# Patient Record
Sex: MALE | Race: WHITE | NOT HISPANIC OR LATINO | Employment: FULL TIME | URBAN - NONMETROPOLITAN AREA
[De-identification: names, ages, dates, MRNs, and addresses within clinical notes are randomized per-mention and may not be internally consistent; named-entity substitution may affect disease eponyms.]

---

## 2018-01-23 ENCOUNTER — HISTORY (OUTPATIENT)
Dept: FAMILY MEDICINE | Facility: OTHER | Age: 43
End: 2018-01-23

## 2018-01-23 ENCOUNTER — OFFICE VISIT - GICH (OUTPATIENT)
Dept: FAMILY MEDICINE | Facility: OTHER | Age: 43
End: 2018-01-23

## 2018-01-23 DIAGNOSIS — Z23 ENCOUNTER FOR IMMUNIZATION: ICD-10-CM

## 2018-01-23 DIAGNOSIS — Z13.1 ENCOUNTER FOR SCREENING FOR DIABETES MELLITUS: ICD-10-CM

## 2018-01-23 DIAGNOSIS — D22.9 MELANOCYTIC NEVUS: ICD-10-CM

## 2018-01-23 DIAGNOSIS — Z13.220 ENCOUNTER FOR SCREENING FOR LIPOID DISORDERS: ICD-10-CM

## 2018-01-29 ENCOUNTER — DOCUMENTATION ONLY (OUTPATIENT)
Dept: FAMILY MEDICINE | Facility: OTHER | Age: 43
End: 2018-01-29

## 2018-02-06 ENCOUNTER — OFFICE VISIT - GICH (OUTPATIENT)
Dept: FAMILY MEDICINE | Facility: OTHER | Age: 43
End: 2018-02-06

## 2018-02-06 ENCOUNTER — HISTORY (OUTPATIENT)
Dept: FAMILY MEDICINE | Facility: OTHER | Age: 43
End: 2018-02-06

## 2018-02-06 DIAGNOSIS — Z13.1 ENCOUNTER FOR SCREENING FOR DIABETES MELLITUS: ICD-10-CM

## 2018-02-06 DIAGNOSIS — Z48.02 ENCOUNTER FOR REMOVAL OF SUTURES: ICD-10-CM

## 2018-02-06 DIAGNOSIS — Z13.220 ENCOUNTER FOR SCREENING FOR LIPOID DISORDERS: ICD-10-CM

## 2018-02-06 LAB
ANION GAP - HISTORICAL: 14 (ref 5–18)
BUN SERPL-MCNC: 16 MG/DL (ref 7–25)
BUN/CREAT RATIO - HISTORICAL: 18
CALCIUM SERPL-MCNC: 9.8 MG/DL (ref 8.6–10.3)
CHLORIDE SERPLBLD-SCNC: 101 MMOL/L (ref 98–107)
CHOL/HDL RATIO - HISTORICAL: 4.57
CHOLESTEROL TOTAL: 215 MG/DL
CO2 SERPL-SCNC: 25 MMOL/L (ref 21–31)
CREAT SERPL-MCNC: 0.89 MG/DL (ref 0.7–1.3)
GFR IF NOT AFRICAN AMERICAN - HISTORICAL: >60 ML/MIN/1.73M2
GLUCOSE SERPL-MCNC: 106 MG/DL (ref 70–105)
HDLC SERPL-MCNC: 47 MG/DL (ref 23–92)
LDLC SERPL CALC-MCNC: 121 MG/DL
NON-HDL CHOLESTEROL - HISTORICAL: 168 MG/DL
POTASSIUM SERPL-SCNC: 4 MMOL/L (ref 3.5–5.1)
PROVIDER ORDERDED STATUS - HISTORICAL: ABNORMAL
SODIUM SERPL-SCNC: 140 MMOL/L (ref 133–143)
TRIGL SERPL-MCNC: 236 MG/DL

## 2018-02-09 VITALS
DIASTOLIC BLOOD PRESSURE: 84 MMHG | HEART RATE: 64 BPM | SYSTOLIC BLOOD PRESSURE: 120 MMHG | BODY MASS INDEX: 39.36 KG/M2 | WEIGHT: 297 LBS | HEIGHT: 73 IN

## 2018-02-09 VITALS — HEART RATE: 76 BPM | WEIGHT: 300 LBS | SYSTOLIC BLOOD PRESSURE: 112 MMHG | DIASTOLIC BLOOD PRESSURE: 78 MMHG

## 2018-02-13 NOTE — PROGRESS NOTES
"Patient Information     Patient Name MRN Sex Da Rivas [id number] Male 1975      Progress Notes by Kevin Rice MD at 2018 10:00 AM     Author:  Kevin Rice MD Service:  (none) Author Type:  Physician     Filed:  2018 11:56 AM Encounter Date:  2018 Status:  Signed     :  Kevin Rice MD (Physician)            Nursing Notes:   Nora Martin  2018 10:34 AM  Signed  Patient comes in to the clinic today to have a mole checked on his back.      SUBJECTIVE:  Da Montalvo is a 42 y.o. Male.  He comes in today for evaluation on a mole on his back. Has been present for 5 years but has been enlarging steadily over the past couple of months. Has been slightly irritated. Patient has not really noticed that his wife has been bothered by it.    He is also due for fasting lipids and fasting glucose. He denies any chest pain. He has not any polyuria, polydipsia or other symptoms concerning for undiagnosed diabetes.      OBJECTIVE:  /84  Pulse 64  Ht 1.842 m (6' 0.5\")  Wt 134.7 kg (297 lb)  BMI 39.73 kg/m2  EXAM:  General Appearance: Pleasant, alert, appropriate appearance for age. No acute distress  Chest/Respiratory Exam: Normal chest wall and respirations. Clear to auscultation.  Cardiovascular Exam: Regular rate and rhythm. S1, S2, no murmur, click, gallop, or rubs.  Skin: 6 x 7 mm macule that is slightly raised with intrinsic light brown probable compound nevi centrally.  It appears inflamed. No ulceration.    Results for orders placed or performed in visit on 12      LAB GRAND ITASCA CONVERSION      Result  Value Ref Range    HEMOGLOBIN A1C GI 5.3 4.9 - 6.0 %       ASSESSMENT/Plan :      Da was seen today for derm problem.    Diagnoses and all orders for this visit:    Need for prophylactic vaccination with combined diphtheria-tetanus-pertussis (DTaP) vaccine  -     TDAP VACCINE IM  -     RI ADMIN VACC INITIAL    Screening cholesterol " level  patient is not fasting but he will come in for screening labs and we can go over them at his next visit.  -     LIPID PANEL; Future    Screening for diabetes mellitus  similar as above.  -     BASIC METABOLIC PANEL; Future    Atypical mole  discussed options. Given location and fact that it is enlarging at think it be reasonable to excise. This could represent basal cell cancer, nevus with atypia or may be inflamed seborrheic keratosis.      Risks, benefits and alternatives discussed with patient.  They voiced good understanding and had the opportunity to ask questions.  Patient gave consent and proceed with procedure.   Area was sterilized with choloraprep.  Anesthetized with 1% lidocaine with epi, total volume 2ml.  Using a 15 blade area was excised with 2mm margins.  Nonabsorbable sutures placed, will remove in 7-10d.    -     PATH TISSUE EXAM; Future  -     PATH TISSUE EXAM        There are no Patient Instructions on file for this visit.    Kevin Rice MD    This document was created using computer generated templates and voice activated software.

## 2018-02-13 NOTE — NURSING NOTE
Patient Information     Patient Name MRN Sex Da Rivas [id number] Male 1975      Nursing Note by Nora Martin at 2018 10:00 AM     Author:  Nora Martin Service:  (none) Author Type:  (none)     Filed:  2018 10:34 AM Encounter Date:  2018 Status:  Signed     :  Nora Martin            Patient comes in to the clinic today to have a mole checked on his back.

## 2018-02-13 NOTE — NURSING NOTE
Patient Information     Patient Name MRN Sex Da Rivas [id number] Male 1975      Nursing Note by Nora Martin at 2018  9:00 AM     Author:  Nora Martin Service:  (none) Author Type:  (none)     Filed:  2018  9:09 AM Encounter Date:  2018 Status:  Signed     :  Nora Martin            Patient comes in to the clinic today for suture removal and fasting labs.

## 2018-02-13 NOTE — PROGRESS NOTES
Patient Information     Patient Name MRN Sex Da Rivas [id number] Male 1975      Progress Notes by Kevin Rice MD at 2018  9:00 AM     Author:  Kevin Rice MD Service:  (none) Author Type:  Physician     Filed:  2018  9:25 AM Encounter Date:  2018 Status:  Signed     :  Kevin Rice MD (Physician)            Nursing Notes:   Nora Martin  2018  9:09 AM  Signed  Patient comes in to the clinic today for suture removal and fasting labs.      SUBJECTIVE:  Da Montalvo is a 42 y.o. Male.  He comes in today for suture removal. No concerns.      OBJECTIVE:  /78 (Cuff Size: Adult Large)  Pulse 76  Wt 136.1 kg (300 lb)  BMI 40.13 kg/m2  EXAM:  Skin: Well-healing incision        ASSESSMENT/Plan :      Da was seen today for suture removal and lab.    Diagnoses and all orders for this visit:    Visit for suture removal  sutures removed. Discussed benign pathology. Suggest regular skin check up with physicals.    Screening for diabetes mellitus  -     BASIC METABOLIC PANEL    Screening cholesterol level  -     LIPID PANEL        There are no Patient Instructions on file for this visit.    Kevin Rice MD    This document was created using computer generated templates and voice activated software.

## 2018-07-23 NOTE — PROGRESS NOTES
Patient Information     Patient Name  Da Montalvo MRN  [id number] Sex  Male   1975      Letter by Kevin Rice MD at      Author:  Kevin Rice MD Service:  (none) Author Type:  (none)    Filed:   Encounter Date:  2018 Status:  (Other)       Da Montalvo  [address] [place] [address]    2018      Dear Mr. Montalvo,      The pathologist reviewed your excision and it was a benign growth called a seborrheic keratosis.  There was some inflammation that made it appear to grow and look a little abnormal.  In any event, this is good news and does not require any further treatment.  We should still plan on taking out the sutures next week.    I'm sending along the actual report so that you will have it for your general interest and  records.       Take Care,   Kevin Rice MD      No results found from the In Basket message.

## 2018-07-23 NOTE — PROGRESS NOTES
Patient Information     Patient Name  Da Montalvo MRN  [id number] Sex  Male   1975      Letter by Kevin Rice MD at      Author:  Kevin Rice MD Service:  (none) Author Type:  (none)    Filed:   Encounter Date:  2018 Status:  (Other)         Da Montalvo  [address] [place] [address]    2018      Dear Mr. Montalvo,    Your lab results are listed below.  Your blood sugar is just minimally elevated but this does suggest you are at increased risk for developing diabetes in the next several years.  We should recheck this in 6 months.  Your cholesterol is also high but not to the degree that you need to start medications.  The best thing you can do for your health is really focus on eating lots of fruits and vegetables and staying away from saturated fats, sodium and processed foods including sweets and breads.  If you are able to loose 20lbs over the next 6 months I suspect you will have a significant improvement in your numbers but also in your health and wellbeing.      Contact us with any questions.    I'm sending along your actual numbers so that you will have them for your general interest and your records.       Take Care,   Kevin Rice MD      Resulted Orders      BASIC METABOLIC PANEL (Collected: 2018  9:20 AM)      Result  Value Ref Range    SODIUM 140 133 - 143 mmol/L    POTASSIUM 4.0 3.5 - 5.1 mmol/L    CHLORIDE 101 98 - 107 mmol/L    CO2,TOTAL 25 21 - 31 mmol/L    ANION GAP 14 5 - 18                    GLUCOSE 106 (H) 70 - 105 mg/dL    CALCIUM 9.8 8.6 - 10.3 mg/dL    BUN 16 7 - 25 mg/dL    CREATININE 0.89 0.70 - 1.30 mg/dL    BUN/CREAT RATIO           18                    GFR if African American >60 >60 ml/min/1.73m2    GFR if not African American >60 >60 ml/min/1.73m2   LIPID PANEL (Collected: 2018  9:20 AM)      Result  Value Ref Range    CHOLESTEROL,TOTAL 215 (H) <200 mg/dL    TRIGLYCERIDES 236 (H) <150 mg/dL    HDL CHOLESTEROL 47 23 - 92  mg/dL    NON-HDL CHOLESTEROL 168 (H) <145 mg/dl    CHOL/HDL RATIO            4.57 (H) <4.50                    LDL CHOLESTEROL 121 (H) <100 mg/dL    PROVIDER ORDERED STATUS FASTING

## 2018-12-20 ENCOUNTER — OFFICE VISIT (OUTPATIENT)
Dept: FAMILY MEDICINE | Facility: OTHER | Age: 43
End: 2018-12-20
Attending: FAMILY MEDICINE
Payer: COMMERCIAL

## 2018-12-20 VITALS
DIASTOLIC BLOOD PRESSURE: 73 MMHG | TEMPERATURE: 97.4 F | SYSTOLIC BLOOD PRESSURE: 117 MMHG | RESPIRATION RATE: 18 BRPM | HEART RATE: 80 BPM | BODY MASS INDEX: 40.61 KG/M2 | WEIGHT: 303.6 LBS

## 2018-12-20 DIAGNOSIS — E66.01 MORBID OBESITY (H): ICD-10-CM

## 2018-12-20 DIAGNOSIS — Z23 NEED FOR PROPHYLACTIC VACCINATION AND INOCULATION AGAINST INFLUENZA: Primary | ICD-10-CM

## 2018-12-20 DIAGNOSIS — B07.0 PLANTAR WARTS: ICD-10-CM

## 2018-12-20 PROCEDURE — 17110 DESTRUCTION B9 LES UP TO 14: CPT | Performed by: FAMILY MEDICINE

## 2018-12-20 PROCEDURE — 90471 IMMUNIZATION ADMIN: CPT | Performed by: FAMILY MEDICINE

## 2018-12-20 PROCEDURE — 90686 IIV4 VACC NO PRSV 0.5 ML IM: CPT | Performed by: FAMILY MEDICINE

## 2018-12-20 RX ORDER — TRIAMCINOLONE ACETONIDE 55 UG/1
2 SPRAY, METERED NASAL DAILY
COMMUNITY

## 2018-12-20 ASSESSMENT — PAIN SCALES - GENERAL: PAINLEVEL: NO PAIN (0)

## 2018-12-20 NOTE — NURSING NOTE
Patient is here for treatment of  Wart on bottom of right foot, has not had treated before.  Lanie Yao LPN .............12/20/2018     3:21 PM        Medication Reconciliation: complete    Lanie Yao LPN  12/20/2018 3:27 PM

## 2018-12-20 NOTE — PROGRESS NOTES
Injectable Influenza Immunization Documentation    1.  Is the person to be vaccinated sick today?   No    2. Does the person to be vaccinated have an allergy to a component   of the vaccine?   No  Egg Allergy Algorithm Link    3. Has the person to be vaccinated ever had a serious reaction   to influenza vaccine in the past?   No    4. Has the person to be vaccinated ever had Guillain-Barré syndrome?   No    Form completed by Lanie Yao LPN .............12/20/2018     3:24  Prior to injection, verified patient identity using patient's name and date of birth.  Due to injection administration, patient instructed to remain in clinic for 15 minutes  afterwards, and to report any adverse reaction to me immediately.    flu    Drug Amount Wasted:  None.  Vial/Syringe: Syringe

## 2018-12-20 NOTE — PROGRESS NOTES
SUBJECTIVE: 43 year old male complains of warts.   They have been present for 3 month(s).    OBJECTIVE: 1 wart(s) noted on the right plantar. Size range is 0.3 cm.    ASSESSMENT: Warts (Verruca Vulgaris)    PLAN: The viral etiology and natural history has been discussed.   Various treatment methods, side effects and failure rates have been   discussed.  A choice of liquid nitrogen was made, and the expected   blistering or scabbing reaction explained.  Liquid nitrogen was   applied to 1 wart(s);  the patient will return at 2-4 week intervals   for retreatments as needed.   Aimee Ugarte MD

## 2019-01-08 ENCOUNTER — OFFICE VISIT (OUTPATIENT)
Dept: FAMILY MEDICINE | Facility: OTHER | Age: 44
End: 2019-01-08
Attending: FAMILY MEDICINE
Payer: COMMERCIAL

## 2019-01-08 VITALS
BODY MASS INDEX: 40.4 KG/M2 | HEART RATE: 62 BPM | RESPIRATION RATE: 18 BRPM | WEIGHT: 302 LBS | SYSTOLIC BLOOD PRESSURE: 114 MMHG | DIASTOLIC BLOOD PRESSURE: 73 MMHG | TEMPERATURE: 97.3 F

## 2019-01-08 DIAGNOSIS — B07.0 PLANTAR WARTS: Primary | ICD-10-CM

## 2019-01-08 PROCEDURE — 17110 DESTRUCTION B9 LES UP TO 14: CPT | Performed by: FAMILY MEDICINE

## 2019-01-08 ASSESSMENT — PAIN SCALES - GENERAL: PAINLEVEL: NO PAIN (0)

## 2019-01-08 NOTE — NURSING NOTE
Patient is here for second treatment of wart on right foot. After first treatment felt great but is getting uncomfortable again.   Lanie Yao LPN .............1/8/2019     3:04 PM        Medication Reconciliation: complete    Lanie Yao LPN  1/8/2019 3:07 PM

## 2019-01-08 NOTE — PROGRESS NOTES
SUBJECTIVE: 43 year old male complains of warts.   They have been present for 4 month(s).    OBJECTIVE: 1 wart(s) noted on the right plantar. Size range is 0.2 cm.    ASSESSMENT: Warts (Verruca Vulgaris)    PLAN: The viral etiology and natural history has been discussed.   Various treatment methods, side effects and failure rates have been   discussed.  A choice of liquid nitrogen was made, and the expected   blistering or scabbing reaction explained.  Liquid nitrogen was   applied to 1 wart(s);  the patient will return at 2-4 week intervals   for retreatments as needed.   Aimee Ugarte MD

## 2019-01-25 ENCOUNTER — OFFICE VISIT (OUTPATIENT)
Dept: FAMILY MEDICINE | Facility: OTHER | Age: 44
End: 2019-01-25
Attending: FAMILY MEDICINE
Payer: COMMERCIAL

## 2019-01-25 VITALS
SYSTOLIC BLOOD PRESSURE: 118 MMHG | TEMPERATURE: 98.2 F | HEART RATE: 90 BPM | WEIGHT: 303 LBS | RESPIRATION RATE: 24 BRPM | DIASTOLIC BLOOD PRESSURE: 80 MMHG | BODY MASS INDEX: 40.53 KG/M2

## 2019-01-25 DIAGNOSIS — B07.0 PLANTAR WARTS: Primary | ICD-10-CM

## 2019-01-25 PROCEDURE — 17110 DESTRUCTION B9 LES UP TO 14: CPT | Performed by: FAMILY MEDICINE

## 2019-01-25 ASSESSMENT — PAIN SCALES - GENERAL: PAINLEVEL: NO PAIN (0)

## 2019-01-25 NOTE — LETTER
January 25, 2019      Da LAU Selvin  [address] [place] [address]        To Whom It May Concern:    Da Montalvo was seen in our clinic. He may return to work without restrictions.      Sincerely,        Aimee Ugarte MD

## 2019-01-25 NOTE — NURSING NOTE
Patient is here for repeat treatment of wart on right foot.  Lanie Yao LPN .............1/25/2019     11:55 AM      Medication Reconciliation: complete    Lanie Yao LPN  1/25/2019 11:57 AM

## 2019-01-25 NOTE — PROGRESS NOTES
SUBJECTIVE: 43 year old male needs retreatment of wart(s).    OBJECTIVE: 1 wart(s) noted on the right plantar. Size range is 0.3 cm.    ASSESSMENT: Warts (Verruca Vulgaris)    PLAN: Liquid nitrogen was applied to 1 wart(s);  the patient will   return at 2-4 week intervals for retreatments as needed.    Suspect he iwll not need additional treatments    Aimee Ugarte MD

## 2019-12-05 ENCOUNTER — OFFICE VISIT (OUTPATIENT)
Dept: FAMILY MEDICINE | Facility: OTHER | Age: 44
End: 2019-12-05
Attending: FAMILY MEDICINE
Payer: COMMERCIAL

## 2019-12-05 VITALS
SYSTOLIC BLOOD PRESSURE: 102 MMHG | WEIGHT: 309 LBS | OXYGEN SATURATION: 95 % | BODY MASS INDEX: 41.85 KG/M2 | HEIGHT: 72 IN | TEMPERATURE: 97.5 F | HEART RATE: 73 BPM | DIASTOLIC BLOOD PRESSURE: 64 MMHG

## 2019-12-05 DIAGNOSIS — Z11.4 SCREENING FOR HIV (HUMAN IMMUNODEFICIENCY VIRUS): ICD-10-CM

## 2019-12-05 DIAGNOSIS — Z00.00 ROUTINE GENERAL MEDICAL EXAMINATION AT A HEALTH CARE FACILITY: Primary | ICD-10-CM

## 2019-12-05 DIAGNOSIS — Z23 NEED FOR PROPHYLACTIC VACCINATION AND INOCULATION AGAINST INFLUENZA: ICD-10-CM

## 2019-12-05 DIAGNOSIS — Q53.10 UNILATERAL UNDESCENDED TESTICLE, UNSPECIFIED LOCATION: ICD-10-CM

## 2019-12-05 DIAGNOSIS — J33.9 NASAL POLYP: ICD-10-CM

## 2019-12-05 DIAGNOSIS — Z13.220 LIPID SCREENING: ICD-10-CM

## 2019-12-05 LAB
ALBUMIN SERPL-MCNC: 4 G/DL (ref 3.4–5)
ALP SERPL-CCNC: 71 U/L (ref 40–150)
ALT SERPL W P-5'-P-CCNC: 42 U/L (ref 0–70)
ANION GAP SERPL CALCULATED.3IONS-SCNC: 8 MMOL/L (ref 3–14)
AST SERPL W P-5'-P-CCNC: 20 U/L (ref 0–45)
BILIRUB SERPL-MCNC: 0.4 MG/DL (ref 0.2–1.3)
BUN SERPL-MCNC: 12 MG/DL (ref 7–30)
CALCIUM SERPL-MCNC: 9 MG/DL (ref 8.5–10.1)
CHLORIDE SERPL-SCNC: 106 MMOL/L (ref 94–109)
CHOLEST SERPL-MCNC: 216 MG/DL
CO2 SERPL-SCNC: 24 MMOL/L (ref 20–32)
CREAT SERPL-MCNC: 0.8 MG/DL (ref 0.66–1.25)
GFR SERPL CREATININE-BSD FRML MDRD: >90 ML/MIN/{1.73_M2}
GLUCOSE SERPL-MCNC: 100 MG/DL (ref 70–99)
HDLC SERPL-MCNC: 42 MG/DL
LDLC SERPL CALC-MCNC: 124 MG/DL
NONHDLC SERPL-MCNC: 174 MG/DL
POTASSIUM SERPL-SCNC: 3.9 MMOL/L (ref 3.4–5.3)
PROT SERPL-MCNC: 7.8 G/DL (ref 6.8–8.8)
SODIUM SERPL-SCNC: 138 MMOL/L (ref 133–144)
TRIGL SERPL-MCNC: 250 MG/DL

## 2019-12-05 PROCEDURE — 36415 COLL VENOUS BLD VENIPUNCTURE: CPT | Performed by: FAMILY MEDICINE

## 2019-12-05 PROCEDURE — 80061 LIPID PANEL: CPT | Performed by: FAMILY MEDICINE

## 2019-12-05 PROCEDURE — 80053 COMPREHEN METABOLIC PANEL: CPT | Performed by: FAMILY MEDICINE

## 2019-12-05 PROCEDURE — 99386 PREV VISIT NEW AGE 40-64: CPT | Mod: 25 | Performed by: FAMILY MEDICINE

## 2019-12-05 PROCEDURE — 90471 IMMUNIZATION ADMIN: CPT | Performed by: FAMILY MEDICINE

## 2019-12-05 PROCEDURE — 90686 IIV4 VACC NO PRSV 0.5 ML IM: CPT | Performed by: FAMILY MEDICINE

## 2019-12-05 RX ORDER — MUPIROCIN 20 MG/G
OINTMENT TOPICAL 3 TIMES DAILY
Qty: 22 G | Refills: 1 | Status: SHIPPED | OUTPATIENT
Start: 2019-12-05 | End: 2020-11-07

## 2019-12-05 ASSESSMENT — ANXIETY QUESTIONNAIRES
IF YOU CHECKED OFF ANY PROBLEMS ON THIS QUESTIONNAIRE, HOW DIFFICULT HAVE THESE PROBLEMS MADE IT FOR YOU TO DO YOUR WORK, TAKE CARE OF THINGS AT HOME, OR GET ALONG WITH OTHER PEOPLE: NOT DIFFICULT AT ALL
1. FEELING NERVOUS, ANXIOUS, OR ON EDGE: NOT AT ALL
2. NOT BEING ABLE TO STOP OR CONTROL WORRYING: NOT AT ALL
6. BECOMING EASILY ANNOYED OR IRRITABLE: SEVERAL DAYS
3. WORRYING TOO MUCH ABOUT DIFFERENT THINGS: NOT AT ALL
7. FEELING AFRAID AS IF SOMETHING AWFUL MIGHT HAPPEN: NOT AT ALL
GAD7 TOTAL SCORE: 1
5. BEING SO RESTLESS THAT IT IS HARD TO SIT STILL: NOT AT ALL

## 2019-12-05 ASSESSMENT — PATIENT HEALTH QUESTIONNAIRE - PHQ9
SUM OF ALL RESPONSES TO PHQ QUESTIONS 1-9: 4
5. POOR APPETITE OR OVEREATING: NOT AT ALL

## 2019-12-05 ASSESSMENT — MIFFLIN-ST. JEOR: SCORE: 2334.61

## 2019-12-05 ASSESSMENT — PAIN SCALES - GENERAL: PAINLEVEL: NO PAIN (0)

## 2019-12-05 NOTE — NURSING NOTE
Chief Complaint   Patient presents with     Physical       Initial /64 (BP Location: Right arm, Patient Position: Chair, Cuff Size: Adult Large)   Pulse 73   Temp 97.5  F (36.4  C) (Tympanic)   Ht 1.829 m (6')   Wt 140.2 kg (309 lb)   SpO2 95%   BMI 41.91 kg/m   Estimated body mass index is 41.91 kg/m  as calculated from the following:    Height as of this encounter: 1.829 m (6').    Weight as of this encounter: 140.2 kg (309 lb).  Medication Reconciliation: complete  Marilia Barclay LPN
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
## 2019-12-06 ASSESSMENT — ANXIETY QUESTIONNAIRES: GAD7 TOTAL SCORE: 1

## 2020-11-07 ENCOUNTER — ALLIED HEALTH/NURSE VISIT (OUTPATIENT)
Dept: FAMILY MEDICINE | Facility: OTHER | Age: 45
End: 2020-11-07
Attending: FAMILY MEDICINE
Payer: COMMERCIAL

## 2020-11-07 DIAGNOSIS — Z20.822 EXPOSURE TO COVID-19 VIRUS: Primary | ICD-10-CM

## 2020-11-07 DIAGNOSIS — Z20.822 EXPOSURE TO COVID-19 VIRUS: ICD-10-CM

## 2020-11-07 DIAGNOSIS — Z20.822 EXPOSURE TO 2019 NOVEL CORONAVIRUS: Primary | ICD-10-CM

## 2020-11-07 PROCEDURE — 99212 OFFICE O/P EST SF 10 MIN: CPT | Mod: 95 | Performed by: FAMILY MEDICINE

## 2020-11-07 PROCEDURE — C9803 HOPD COVID-19 SPEC COLLECT: HCPCS

## 2020-11-07 PROCEDURE — U0003 INFECTIOUS AGENT DETECTION BY NUCLEIC ACID (DNA OR RNA); SEVERE ACUTE RESPIRATORY SYNDROME CORONAVIRUS 2 (SARS-COV-2) (CORONAVIRUS DISEASE [COVID-19]), AMPLIFIED PROBE TECHNIQUE, MAKING USE OF HIGH THROUGHPUT TECHNOLOGIES AS DESCRIBED BY CMS-2020-01-R: HCPCS | Mod: ZL | Performed by: FAMILY MEDICINE

## 2020-11-07 PROCEDURE — 99207 PR NO CHARGE NURSE ONLY: CPT

## 2020-11-07 NOTE — PROGRESS NOTES
"Da Montalvo is a 45 year old male who is being evaluated via a billable telephone visit.      The patient has been notified of following:     \"This telephone visit will be conducted via a call between you and your physician/provider. We have found that certain health care needs can be provided without the need for a physical exam.  This service lets us provide the care you need with a short phone conversation.  If a prescription is necessary we can send it directly to your pharmacy.  If lab work is needed we can place an order for that and you can then stop by our lab to have the test done at a later time.    Telephone visits are billed at different rates depending on your insurance coverage. During this emergency period, for some insurers they may be billed the same as an in-person visit.  Please reach out to your insurance provider with any questions.    If during the course of the call the physician/provider feels a telephone visit is not appropriate, you will not be charged for this service.\"    Patient has given verbal consent for Telephone visit?  Yes      Subjective     Da Montalvo is a 45 year old male who presents via phone visit today for the following health issues:    HPI     Stayed with brother in [place]  Thur - Mon AM  Head congestion, but typical for him with allergies  No other COVID symptoms   He has been staying in garage away from family    Review of Systems          Objective          Vitals:  No vitals were obtained today due to virtual visit.    healthy, alert and no distress  PSYCH: Alert and oriented times 3; coherent speech, normal   rate and volume, able to articulate logical thoughts, able   to abstract reason, no tangential thoughts, no hallucinations   or delusions  His affect is normal  RESP: No cough, no audible wheezing, able to talk in full sentences  Remainder of exam unable to be completed due to telephone visits            Assessment/Plan:      ICD-10-CM    1. Exposure to COVID-19 " virus  Z20.828 Asymptomatic COVID-19 Virus (Coronavirus) by PCR     Significant potential exposure to Covid.  Regardless of test results he needs to complete 14 days of quarantine.  If he becomes symptomatic, consider to be contagious for 10 days from symptom start.  He would like to be tested for peace of mind.  Ordered curbside testing and given instructions.    Phone call duration:  6 minutes

## 2020-11-08 LAB
SARS-COV-2 RNA SPEC QL NAA+PROBE: ABNORMAL
SPECIMEN SOURCE: ABNORMAL

## 2021-01-03 ENCOUNTER — HEALTH MAINTENANCE LETTER (OUTPATIENT)
Age: 46
End: 2021-01-03

## 2021-01-15 ENCOUNTER — HEALTH MAINTENANCE LETTER (OUTPATIENT)
Age: 46
End: 2021-01-15

## 2021-05-24 ENCOUNTER — OFFICE VISIT (OUTPATIENT)
Dept: FAMILY MEDICINE | Facility: OTHER | Age: 46
End: 2021-05-24
Attending: PHYSICIAN ASSISTANT
Payer: COMMERCIAL

## 2021-05-24 VITALS
SYSTOLIC BLOOD PRESSURE: 128 MMHG | RESPIRATION RATE: 20 BRPM | DIASTOLIC BLOOD PRESSURE: 86 MMHG | HEART RATE: 74 BPM | TEMPERATURE: 98.6 F | OXYGEN SATURATION: 95 %

## 2021-05-24 DIAGNOSIS — S86.112A GASTROCNEMIUS MUSCLE TEAR, LEFT, INITIAL ENCOUNTER: ICD-10-CM

## 2021-05-24 DIAGNOSIS — M79.662 PAIN OF LEFT CALF: Primary | ICD-10-CM

## 2021-05-24 PROCEDURE — 99213 OFFICE O/P EST LOW 20 MIN: CPT | Performed by: PHYSICIAN ASSISTANT

## 2021-05-24 PROCEDURE — C9803 HOPD COVID-19 SPEC COLLECT: HCPCS

## 2021-05-24 ASSESSMENT — PAIN SCALES - GENERAL: PAINLEVEL: MILD PAIN (3)

## 2021-05-24 NOTE — PROGRESS NOTES
ASSESSMENT/PLAN:    Differential Diagnoses: gastrocnemius tear vs rupture vs sprain, achilles injury    I have reviewed the nursing notes.  I have reviewed the findings, diagnosis, plan and need for follow up with the patient.    (M79.662) Pain of left calf  (primary encounter diagnosis)  (S86.112A) Gastrocnemius muscle tear, left, initial encounter  Comment: acute onset  Plan: MR Tibia Fibula Lower Leg Left wo Contrast,         Crutches Order for DME - ONLY FOR DME  Vitals stable. PE consistent with probable rupture vs tear of medial gastrocnemius muscle. Did discuss with patient that XR would likely not be beneficial as would only show soft tissue swelling, U/S would assist in diagnosis but that MR would be definitive imaging of choice and that even with U/S an MR would likely still be ordered. I placed the MRI ordered for patient to expedite process for patient, he was also placed on crutches. We discussed use of CAM walker boot and will defer at this time as unsure extent of injury. Recommend use of crutches and off work until imaging is completed, letter written for patient. He should follow up with his PCP for definitive care and further management (referrals) if needed. He may alternate heat and ice, tylenol and ibuprofen every 4-6 hours. Daily limits reviewed. Follow up with worsening pain, redness, shortness of breath or other concerns. Patient is in agreement and understanding of the above treatment plan. All questions and concerns were addressed and answered to patient's satisfaction. AVS reviewed with patient.     Discussed warning signs/symptoms indicative of need to f/u    Follow up if symptoms persist or worsen or concerns    I explained my diagnostic considerations and recommendations to the patient, who voiced understanding and agreement with the treatment plan. All questions were answered. We discussed potential side effects of any prescribed or recommended therapies, as well as expectations for  response to treatments.    Miriam Pascal PA-C  2021  6:06 PM    HPI:    Da Montalvo is a 46 year old male  who presents to [facility] today for concerns of left leg injury. He was pushing a car onto a trailer and felt something go in his left leg. Felt like it hit in the back of his knee to the quad region - minimal pain. Felt a rubber band sensation, then let loose in posterior left leg.     Pain: minimal  Quality: sore  NTB: none  Treatment tried: none  Prior falls, injuries or trauma: none  Additional symptoms to report: none    NKDA    PCP: MD Toni    Past Medical History:   Diagnosis Date     Atrophy of testis     left     Concussion with loss of consciousness [id number]    Head injury/concussion; observation at [facility]     Nicotine dependence, uncomplicated           Obesity           Other seasonal allergic rhinitis     Seasonal allergic rhinitis     Past Surgical History:   Procedure Laterality Date     HERNIA REPAIR  1980          ORCHIOPEXY CHILD Left 1980          TONSILLECTOMY, ADENOIDECTOMY, COMBINED  1983          Social History     Tobacco Use     Smoking status: Former Smoker     Quit date: 2013     Years since quittin.3     Smokeless tobacco: Never Used   Substance Use Topics     Alcohol use: Yes     Alcohol/week: 1.0 standard drinks     Comment: social      Current Outpatient Medications   Medication Sig Dispense Refill     triamcinolone (NASACORT) 55 MCG/ACT nasal aerosol Spray 2 sprays into both nostrils daily       No Known Allergies  Past medical history, past surgical history, current medications and allergies reviewed and accurate to the best of my knowledge.      ROS:  Refer to HPI    /86   Pulse 74   Temp 98.6  F (37  C) (Tympanic)   Resp 20   SpO2 95%     EXAM:  General Appearance: Well appearing 46-year old male, appropriate appearance for age. No acute distress  Respiratory: normal chest wall and respirations.  Normal effort.  Clear to  auscultation bilaterally, no wheezing, crackles or rhonchi.  No increased work of breathing.  No cough appreciated.  Cardiac: RRR with no murmurs  Musculoskeletal:    Bilateral Knee Examination:  Gait: normal gait with no signs of guarding or compensation. Patient is able to get up and go from a seated position, in order to ambulate.    Appearance of the RIGHT knee: Skin is clean, dry, and non-ecchymotic. Effusion none. Tenderness to palpation none.  Patellar tracking is normal. ROM: flexion 130, extension 0. Stable to varus, valgus, Lachman, A&P drawer ligamentous stressing. Awa negative. Extension strength 5/5. Neurovascular status, distal pulses and sensation intact.    Appearance of the LEFT knee: Skin is clean, dry, and non-ecchymotic. Effusion none. Tenderness to palpation none.  Patellar tracking is normal. ROM: flexion 130, extension 0.  Stable to varus, valgus, Lachman, A&P drawer ligamentous stressing. Awa negative. Extension strength 5/5. Neurovascular status, distal pulses and sensation intact.    Left CALF/ANKLE PHYSICAL EXAMINATION:  Inspection: left calf 1.5 times size of right side, there is visible swelling to gastrocnemius muscle. No swelling, erythema, etc. No ecchymosis.     Palpation: Tenderness to palpation over medial head of gastrocnemius consistent with tear (at minimum) of gastrocnemius    ROM: plantarflexion full with pain in gastrocnemius at end ROM, dorsiflexion full, inversion ful, eversion full.     Stability testing:   Anterior drawer: negative    Syndesmotic Stress tests: Bump test/Squeeze tests are negative   Muñoz test: negative    Talar tilt: negative, no sign of calcaneofibular ligament strain   Inversion for Deltoid Ligament: 0 laxity to ligamentous stressing   Eversion for ATF Ligament: 0 laxity to ligamentous stressing     Neurovascular Exam:   Pulses: Dorsalis pedis and Posterior tibial pulse 2+   Capillary refill intact < 3 seconds   Sensation intact, patient  able to wiggle/move all toes   Dermatological: no rashes noted of exposed skin  Psychological: normal affect, alert, oriented, and pleasant.     Labs:  None    Xray:  MRI ordered outpatient

## 2021-05-24 NOTE — NURSING NOTE
Chief Complaint   Patient presents with     Leg Injury     Was pushing a car onto a trailer and he felt something let go in his left leg. He states that he felt it in the back of his knee to back of his quad area.    Initial There were no vitals taken for this visit. Estimated body mass index is 41.91 kg/m  as calculated from the following:    Height as of 12/5/19: 1.829 m (6').    Weight as of 12/5/19: 140.2 kg (309 lb).         Medication Reconciliation: Complete      Zain Mojica LPN

## 2021-05-24 NOTE — LETTER
[facility]  [address]  [place] [address]  Phone: [phone]  Fax: [phone]    May 24, 2021        Da Montalvo  [address] [place] [address]          To whom it may concern:    RE: Da Montalvo    Patient was seen and treated today at our clinic. He will be out of work indefinitely.     Please contact me for questions or concerns.      Sincerely,        Miriam Pascal PA-C

## 2021-05-25 ENCOUNTER — HOSPITAL ENCOUNTER (OUTPATIENT)
Dept: MRI IMAGING | Facility: OTHER | Age: 46
Discharge: HOME | End: 2021-05-25
Attending: PHYSICIAN ASSISTANT | Admitting: PHYSICIAN ASSISTANT
Payer: COMMERCIAL

## 2021-05-25 DIAGNOSIS — M79.662 PAIN OF LEFT CALF: ICD-10-CM

## 2021-05-25 DIAGNOSIS — S86.112A GASTROCNEMIUS MUSCLE TEAR, LEFT, INITIAL ENCOUNTER: ICD-10-CM

## 2021-05-25 PROCEDURE — 73718 MRI LOWER EXTREMITY W/O DYE: CPT | Mod: LT

## 2021-05-25 NOTE — PROGRESS NOTES
Assessment & Plan     Strain of left calf muscle  Tennis leg with tear and edema.  MRI reviewed.  He is a  at [employer].  No way he can do that right now.  Plan to reassess at about the 4 week point.  appt made for 6/17 and he will report any change for the worse.  We talked about ortho, but we're both pretty comfortable with this as no surgery indicated and he is already slightly better.          25 minutes spent with patient, exam, documentation, and literature review along with the form.         BMI:   Estimated body mass index is 40.01 kg/m  as calculated from the following:    Height as of this encounter: 1.829 m (6').    Weight as of this encounter: 133.8 kg (295 lb).           No follow-ups on file.    David Muñoz MD  [facility]   Da is a 46 year old who presents for the following health issues     HPI   ED/UC Followup:    Facility:  [facility]  Date of visit: 5/24/21  Reason for visit: left lower leg injury  Current Status: MRI done 5/25/21              Review of Systems   Constitutional, HEENT, cardiovascular, pulmonary, gi and gu systems are negative, except as otherwise noted.      Objective    /70   Pulse 61   Temp 97.2  F (36.2  C)   Ht 1.829 m (6')   Wt 133.8 kg (295 lb)   SpO2 97%   BMI 40.01 kg/m    Body mass index is 40.01 kg/m .  Physical Exam   GENERAL: healthy, alert and no distress  MS: tender over the left proximal calf.          4 week f/u sooner with concerns.  All questions answered.  We did a literature review on this prior to the visit as well.        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

## 2021-05-27 ENCOUNTER — OFFICE VISIT (OUTPATIENT)
Dept: FAMILY MEDICINE | Facility: OTHER | Age: 46
End: 2021-05-27
Attending: PHYSICIAN ASSISTANT
Payer: COMMERCIAL

## 2021-05-27 VITALS
WEIGHT: 295 LBS | OXYGEN SATURATION: 97 % | HEIGHT: 72 IN | HEART RATE: 61 BPM | DIASTOLIC BLOOD PRESSURE: 70 MMHG | TEMPERATURE: 97.2 F | SYSTOLIC BLOOD PRESSURE: 116 MMHG | BODY MASS INDEX: 39.96 KG/M2

## 2021-05-27 DIAGNOSIS — S86.812A STRAIN OF LEFT CALF MUSCLE: Primary | ICD-10-CM

## 2021-05-27 PROCEDURE — 99214 OFFICE O/P EST MOD 30 MIN: CPT | Performed by: FAMILY MEDICINE

## 2021-05-27 RX ORDER — CETIRIZINE HYDROCHLORIDE 10 MG/1
1 TABLET ORAL DAILY
COMMUNITY
Start: 2021-05-01

## 2021-05-27 ASSESSMENT — PAIN SCALES - GENERAL: PAINLEVEL: MILD PAIN (2)

## 2021-05-27 ASSESSMENT — MIFFLIN-ST. JEOR: SCORE: 2261.11

## 2021-05-27 NOTE — NURSING NOTE
Chief Complaint   Patient presents with     ER F/U       Initial /70   Pulse 61   Temp 97.2  F (36.2  C)   Ht 1.829 m (6')   Wt 133.8 kg (295 lb)   SpO2 97%   BMI 40.01 kg/m   Estimated body mass index is 40.01 kg/m  as calculated from the following:    Height as of this encounter: 1.829 m (6').    Weight as of this encounter: 133.8 kg (295 lb).  Medication Reconciliation: complete  Maria Ines Banuelos LPN

## 2021-06-10 NOTE — PROGRESS NOTES
Assessment & Plan     Pain of left calf  Improved but ongoing.  Any uneven ground he can't really walk on confidently.  Keep him out another couple of weeks.  Recheck late next week.  Hopefully get him back to work as soon as enough stability with gait occurs.                 BMI:   Estimated body mass index is 40.42 kg/m  as calculated from the following:    Height as of 5/27/21: 1.829 m (6').    Weight as of this encounter: 135.2 kg (298 lb).           No follow-ups on file.    David Muñoz MD  [facility]   Da is a 46 year old who presents for the following health issues     HPI     Recheck       Duration: 5/24/21    Description (location/character/radiation): left lower leg    Intensity:  mild    Accompanying signs and symptoms: swelling in ankle 1/10 pain .     History (similar episodes/previous evaluation): mri     Precipitating or alleviating factors: pain worse on stairs, and walking     Therapies tried and outcome: ice, compression sleeve , rest. ibu . Elevation             Review of Systems   Constitutional, HEENT, cardiovascular, pulmonary, gi and gu systems are negative, except as otherwise noted.      Objective    There were no vitals taken for this visit.  There is no height or weight on file to calculate BMI.  Physical Exam   GENERAL: healthy, alert and no distress  MS: no gross musculoskeletal defects noted, no edema.  Photo of bruising at ankle.  SKIN: no suspicious lesions or rashes

## 2021-06-17 ENCOUNTER — OFFICE VISIT (OUTPATIENT)
Dept: FAMILY MEDICINE | Facility: OTHER | Age: 46
End: 2021-06-17
Attending: FAMILY MEDICINE
Payer: COMMERCIAL

## 2021-06-17 VITALS
TEMPERATURE: 98.4 F | SYSTOLIC BLOOD PRESSURE: 112 MMHG | DIASTOLIC BLOOD PRESSURE: 78 MMHG | OXYGEN SATURATION: 95 % | HEART RATE: 87 BPM | WEIGHT: 298 LBS | BODY MASS INDEX: 40.42 KG/M2

## 2021-06-17 DIAGNOSIS — M79.662 PAIN OF LEFT CALF: Primary | ICD-10-CM

## 2021-06-17 PROCEDURE — 99213 OFFICE O/P EST LOW 20 MIN: CPT | Performed by: FAMILY MEDICINE

## 2021-06-17 ASSESSMENT — PAIN SCALES - GENERAL: PAINLEVEL: NO PAIN (1)

## 2021-06-17 NOTE — LETTER
June 17, 2021      Da Montalvo  [address] [place] [address]        To Whom It May Concern:    Da Montalvo was seen in our clinic. He cannot return to work at this time due to injury.  I am seeing him again in clinic on 6/24/21.  Hopefully we can get him cleared in the near future.      Sincerely,        David Muñoz MD

## 2021-06-24 ENCOUNTER — OFFICE VISIT (OUTPATIENT)
Dept: FAMILY MEDICINE | Facility: OTHER | Age: 46
End: 2021-06-24
Attending: FAMILY MEDICINE
Payer: COMMERCIAL

## 2021-06-24 VITALS
HEART RATE: 71 BPM | SYSTOLIC BLOOD PRESSURE: 118 MMHG | TEMPERATURE: 97.9 F | OXYGEN SATURATION: 95 % | WEIGHT: 297 LBS | BODY MASS INDEX: 40.28 KG/M2 | DIASTOLIC BLOOD PRESSURE: 76 MMHG

## 2021-06-24 DIAGNOSIS — M79.662 PAIN OF LEFT CALF: Primary | ICD-10-CM

## 2021-06-24 PROCEDURE — 99213 OFFICE O/P EST LOW 20 MIN: CPT | Performed by: FAMILY MEDICINE

## 2021-06-24 ASSESSMENT — PAIN SCALES - GENERAL: PAINLEVEL: NO PAIN (0)

## 2021-06-24 NOTE — NURSING NOTE
Chief Complaint   Patient presents with     RECHECK       Initial /76   Pulse 71   Temp 97.9  F (36.6  C)   Wt 134.7 kg (297 lb)   SpO2 95%   BMI 40.28 kg/m   Estimated body mass index is 40.28 kg/m  as calculated from the following:    Height as of 5/27/21: 1.829 m (6').    Weight as of this encounter: 134.7 kg (297 lb).  Medication Reconciliation: complete  Maria Ines Baneulos LPN

## 2021-06-24 NOTE — PROGRESS NOTES
Assessment & Plan     Pain of left calf  Tennis leg.  Pain is ongoing slightly but overall stable.  I want him to wait through the weekend and start on Monday and keep working on gentle ROM of the left ankle to ensure stability prior to returning.   /[employer] at [employer] and he will be careful on uneven ground.  Paperwork for return to work done.  He will f/u as needed.  He will wear fabrizio sock on left lower leg for swelling prevention.                 BMI:   Estimated body mass index is 40.28 kg/m  as calculated from the following:    Height as of 5/27/21: 1.829 m (6').    Weight as of this encounter: 134.7 kg (297 lb).           No follow-ups on file.    David Muñoz MD  [facility]   Da is a 46 year old who presents for the following health issues     HPI     Leg injury      Duration: 5/24/21    Description (location/character/radiation): left leg    Intensity:  moderate    Accompanying signs and symptoms: lightness    History (similar episodes/previous evaluation): tennis leg    Precipitating or alleviating factors: None    Therapies tried and outcome: None            Review of Systems   Constitutional, HEENT, cardiovascular, pulmonary, gi and gu systems are negative, except as otherwise noted.      Objective    /76   Pulse 71   Temp 97.9  F (36.6  C)   Wt 134.7 kg (297 lb)   SpO2 95%   BMI 40.28 kg/m    Body mass index is 40.28 kg/m .  Physical Exam   GENERAL: healthy, alert and no distress  MS: no gross musculoskeletal defects noted, no edema  MS: no tenderness.  He has full rom of the ankle.    SKIN: no suspicious lesions or rashes

## 2021-08-16 ASSESSMENT — ANXIETY QUESTIONNAIRES
5. BEING SO RESTLESS THAT IT IS HARD TO SIT STILL: NOT AT ALL
1. FEELING NERVOUS, ANXIOUS, OR ON EDGE: NOT AT ALL
7. FEELING AFRAID AS IF SOMETHING AWFUL MIGHT HAPPEN: NOT AT ALL
GAD7 TOTAL SCORE: 1
6. BECOMING EASILY ANNOYED OR IRRITABLE: SEVERAL DAYS
2. NOT BEING ABLE TO STOP OR CONTROL WORRYING: NOT AT ALL
GAD7 TOTAL SCORE: 1
GAD7 TOTAL SCORE: 1
8. IF YOU CHECKED OFF ANY PROBLEMS, HOW DIFFICULT HAVE THESE MADE IT FOR YOU TO DO YOUR WORK, TAKE CARE OF THINGS AT HOME, OR GET ALONG WITH OTHER PEOPLE?: NOT DIFFICULT AT ALL
3. WORRYING TOO MUCH ABOUT DIFFERENT THINGS: NOT AT ALL
4. TROUBLE RELAXING: NOT AT ALL
7. FEELING AFRAID AS IF SOMETHING AWFUL MIGHT HAPPEN: NOT AT ALL

## 2021-08-16 ASSESSMENT — PATIENT HEALTH QUESTIONNAIRE - PHQ9
SUM OF ALL RESPONSES TO PHQ QUESTIONS 1-9: 0
10. IF YOU CHECKED OFF ANY PROBLEMS, HOW DIFFICULT HAVE THESE PROBLEMS MADE IT FOR YOU TO DO YOUR WORK, TAKE CARE OF THINGS AT HOME, OR GET ALONG WITH OTHER PEOPLE: NOT DIFFICULT AT ALL
SUM OF ALL RESPONSES TO PHQ QUESTIONS 1-9: 0

## 2021-08-16 NOTE — PROGRESS NOTES
SUBJECTIVE:   CC: Da Montalvo is an 46 year old male who presents for preventative health visit.       Patient has been advised of split billing requirements and indicates understanding: Yes  Healthy Habits:     Getting at least 3 servings of Calcium per day:  Yes    Bi-annual eye exam:  NO    Dental care twice a year:  Yes    Sleep apnea or symptoms of sleep apnea:  None    Diet:  Regular (no restrictions)    Frequency of exercise:  1 day/week    Duration of exercise:  15-30 minutes    Taking medications regularly:  Yes    Medication side effects:  None    PHQ-2 Total Score: 0    Additional concerns today:  No              Today's PHQ-2 Score:   PHQ-2 (  [employer]) 2021   Q1: Little interest or pleasure in doing things 0   Q2: Feeling down, depressed or hopeless 0   PHQ-2 Score 0   Q1: Little interest or pleasure in doing things Not at all   Q2: Feeling down, depressed or hopeless Not at all   PHQ-2 Score 0       Abuse: Current or Past(Physical, Sexual or Emotional)- No  Do you feel safe in your environment? Yes        Social History     Tobacco Use     Smoking status: Former Smoker     Quit date: 2013     Years since quittin.6     Smokeless tobacco: Never Used   Substance Use Topics     Alcohol use: Yes     Alcohol/week: 1.0 standard drinks     Comment: social          Alcohol Use 2021   Prescreen: >3 drinks/day or >7 drinks/week? Yes   AUDIT SCORE  5       Last PSA: No results found for: PSA    Reviewed orders with patient. Reviewed health maintenance and updated orders accordingly - Yes  Lab work is in process    Reviewed and updated as needed this visit by clinical staff  Tobacco  Allergies  Meds              Reviewed and updated as needed this visit by Provider                Past Medical History:   Diagnosis Date     Atrophy of testis     left     Concussion with loss of consciousness [id number]    Head injury/concussion; observation at [facility]     Nicotine dependence, uncomplicated            "Obesity           Other seasonal allergic rhinitis     Seasonal allergic rhinitis      Past Surgical History:   Procedure Laterality Date     HERNIA REPAIR  03/03/1980          ORCHIOPEXY CHILD Left 03/03/1980          TONSILLECTOMY, ADENOIDECTOMY, COMBINED  07/25/1983            Review of Systems  CONSTITUTIONAL: NEGATIVE for fever, chills, change in weight  INTEGUMENTARY/SKIN: NEGATIVE for worrisome rashes, moles or lesions  EYES: NEGATIVE for vision changes or irritation  ENT: NEGATIVE for ear, mouth and throat problems  RESP: NEGATIVE for significant cough or SOB  CV: NEGATIVE for chest pain, palpitations or peripheral edema  GI: NEGATIVE for nausea, abdominal pain, heartburn, or change in bowel habits   male: negative for dysuria, hematuria, decreased urinary stream, erectile dysfunction, urethral discharge  MUSCULOSKELETAL: NEGATIVE for significant arthralgias or myalgia  NEURO: NEGATIVE for weakness, dizziness or paresthesias  PSYCHIATRIC: NEGATIVE for changes in mood or affect    OBJECTIVE:   /68   Pulse 63   Temp 97.5  F (36.4  C)   Resp 20   Ht 1.854 m (6' 1\")   Wt 136.1 kg (300 lb)   SpO2 95%   BMI 39.58 kg/m      Physical Exam  GENERAL: healthy, alert and no distress  EYES: Eyes grossly normal to inspection, PERRL and conjunctivae and sclerae normal  HENT: ear canals and TM's normal, nose and mouth without ulcers or lesions  NECK: no adenopathy, no asymmetry, masses, or scars and thyroid normal to palpation  RESP: lungs clear to auscultation - no rales, rhonchi or wheezes  CV: regular rate and rhythm, normal S1 S2, no S3 or S4, no murmur, click or rub, no peripheral edema and peripheral pulses strong  ABDOMEN: soft, nontender, no hepatosplenomegaly, no masses and bowel sounds normal   (male): normal male genitalia without lesions or urethral discharge, no hernia  MS: no gross musculoskeletal defects noted, no edema  SKIN: no suspicious lesions or rashes  NEURO: Normal strength and tone, " "mentation intact and speech normal  PSYCH: mentation appears normal, affect normal/bright    Diagnostic Test Results:  Labs reviewed in Epic    ASSESSMENT/PLAN:       ICD-10-CM    1. Routine general medical examination at a health care facility  Z00.00    2. Lipid screening  Z13.220 Comprehensive metabolic panel     Lipid Profile   3. Screening for HIV (human immunodeficiency virus)  Z11.4 HIV Antigen Antibody Combo   4. Need for hepatitis C screening test  Z11.59 Hepatitis C Screen Reflex to HCV RNA Quant and Genotype       Patient has been advised of split billing requirements and indicates understanding:   COUNSELING:   Reviewed preventive health counseling, as reflected in patient instructions    Estimated body mass index is 39.58 kg/m  as calculated from the following:    Height as of this encounter: 1.854 m (6' 1\").    Weight as of this encounter: 136.1 kg (300 lb).         He reports that he quit smoking about 8 years ago. He has never used smokeless tobacco.      Counseling Resources:  ATP IV Guidelines  Pooled Cohorts Equation Calculator  FRAX Risk Assessment  ICSI Preventive Guidelines  Dietary Guidelines for Americans, 2010  USDA's MyPlate  ASA Prophylaxis  Lung CA Screening    David Muñoz MD  [facility]  "

## 2021-08-17 ENCOUNTER — OFFICE VISIT (OUTPATIENT)
Dept: FAMILY MEDICINE | Facility: OTHER | Age: 46
End: 2021-08-17
Attending: FAMILY MEDICINE
Payer: COMMERCIAL

## 2021-08-17 VITALS
SYSTOLIC BLOOD PRESSURE: 120 MMHG | OXYGEN SATURATION: 95 % | BODY MASS INDEX: 39.76 KG/M2 | TEMPERATURE: 97.5 F | RESPIRATION RATE: 20 BRPM | HEIGHT: 73 IN | WEIGHT: 300 LBS | DIASTOLIC BLOOD PRESSURE: 68 MMHG | HEART RATE: 63 BPM

## 2021-08-17 DIAGNOSIS — Z11.59 NEED FOR HEPATITIS C SCREENING TEST: ICD-10-CM

## 2021-08-17 DIAGNOSIS — Z13.220 LIPID SCREENING: ICD-10-CM

## 2021-08-17 DIAGNOSIS — Z00.00 ROUTINE GENERAL MEDICAL EXAMINATION AT A HEALTH CARE FACILITY: Primary | ICD-10-CM

## 2021-08-17 DIAGNOSIS — Z11.4 SCREENING FOR HIV (HUMAN IMMUNODEFICIENCY VIRUS): ICD-10-CM

## 2021-08-17 LAB
ALBUMIN SERPL-MCNC: 4 G/DL (ref 3.4–5)
ALP SERPL-CCNC: 78 U/L (ref 40–150)
ALT SERPL W P-5'-P-CCNC: 34 U/L (ref 0–70)
ANION GAP SERPL CALCULATED.3IONS-SCNC: 6 MMOL/L (ref 3–14)
AST SERPL W P-5'-P-CCNC: 19 U/L (ref 0–45)
BILIRUB SERPL-MCNC: 0.4 MG/DL (ref 0.2–1.3)
BUN SERPL-MCNC: 11 MG/DL (ref 7–30)
CALCIUM SERPL-MCNC: 9.2 MG/DL (ref 8.5–10.1)
CHLORIDE BLD-SCNC: 107 MMOL/L (ref 94–109)
CHOLEST SERPL-MCNC: 221 MG/DL
CO2 SERPL-SCNC: 25 MMOL/L (ref 20–32)
CREAT SERPL-MCNC: 0.93 MG/DL (ref 0.66–1.25)
FASTING STATUS PATIENT QL REPORTED: YES
GFR SERPL CREATININE-BSD FRML MDRD: >90 ML/MIN/1.73M2
GLUCOSE BLD-MCNC: 95 MG/DL (ref 70–99)
HDLC SERPL-MCNC: 46 MG/DL
LDLC SERPL CALC-MCNC: 126 MG/DL
NONHDLC SERPL-MCNC: 175 MG/DL
POTASSIUM BLD-SCNC: 4 MMOL/L (ref 3.4–5.3)
PROT SERPL-MCNC: 7.9 G/DL (ref 6.8–8.8)
SODIUM SERPL-SCNC: 138 MMOL/L (ref 133–144)
TRIGL SERPL-MCNC: 243 MG/DL

## 2021-08-17 PROCEDURE — 80061 LIPID PANEL: CPT | Performed by: FAMILY MEDICINE

## 2021-08-17 PROCEDURE — 86803 HEPATITIS C AB TEST: CPT | Performed by: FAMILY MEDICINE

## 2021-08-17 PROCEDURE — 87389 HIV-1 AG W/HIV-1&-2 AB AG IA: CPT | Performed by: FAMILY MEDICINE

## 2021-08-17 PROCEDURE — 99396 PREV VISIT EST AGE 40-64: CPT | Performed by: FAMILY MEDICINE

## 2021-08-17 PROCEDURE — 36415 COLL VENOUS BLD VENIPUNCTURE: CPT | Performed by: FAMILY MEDICINE

## 2021-08-17 PROCEDURE — 80053 COMPREHEN METABOLIC PANEL: CPT | Performed by: FAMILY MEDICINE

## 2021-08-17 ASSESSMENT — MIFFLIN-ST. JEOR: SCORE: 2299.67

## 2021-08-17 ASSESSMENT — ANXIETY QUESTIONNAIRES: GAD7 TOTAL SCORE: 1

## 2021-08-17 ASSESSMENT — PAIN SCALES - GENERAL: PAINLEVEL: NO PAIN (0)

## 2021-08-18 LAB
HCV AB SERPL QL IA: NONREACTIVE
HIV 1+2 AB+HIV1 P24 AG SERPL QL IA: NONREACTIVE

## 2021-10-09 ENCOUNTER — HEALTH MAINTENANCE LETTER (OUTPATIENT)
Age: 46
End: 2021-10-09

## 2022-09-17 ENCOUNTER — HEALTH MAINTENANCE LETTER (OUTPATIENT)
Age: 47
End: 2022-09-17

## 2023-01-08 NOTE — NURSING NOTE
"Chief Complaint   Patient presents with     Physical       Initial /68   Pulse 63   Temp 97.5  F (36.4  C)   Resp 20   Ht 1.854 m (6' 1\")   Wt 136.1 kg (300 lb)   SpO2 95%   BMI 39.58 kg/m   Estimated body mass index is 39.58 kg/m  as calculated from the following:    Height as of this encounter: 1.854 m (6' 1\").    Weight as of this encounter: 136.1 kg (300 lb).  Medication Reconciliation: complete  Maria Ines Banuelos, LPN  "
No

## 2023-08-16 NOTE — PROGRESS NOTES
Assessment & Plan     Skin lesion  Likely skin tag with a roughened edge making it look a little like a wart.  It rubs on his fall prevention gear and gets painful.  I removed it today.  I sent to path with the non classical tag look to it.  Follow up pending the results.    - Surgical Pathology Exam      Procedure:  area of left groin is cleansed.  Skin lesion is anesthetized with lidocaine with epi, and the lesion is removed with a dermablade.  I did have to use some silver nitrate for some weeping.  EBL less than 5 ml's.  No complications encountered.             No follow-ups on file.    David Muñoz MD  [facility]    Nitza Lane is a 48 year old, presenting for the following health issues:  Skin Tags      HPI     Skin tag     Duration: years   Description (location/character/radiation): left groin area   Intensity:  moderate  Accompanying signs and symptoms: getting bigger   History (similar episodes/previous evaluation): None  Precipitating or alleviating factors: None  Therapies tried and outcome: None              Review of Systems   Constitutional, HEENT, cardiovascular, pulmonary, gi and gu systems are negative, except as otherwise noted.      Objective    /66   Pulse 101   Temp 98.4  F (36.9  C) (Tympanic)   Wt 141.1 kg (311 lb)   SpO2 95%   BMI 41.03 kg/m    Body mass index is 41.03 kg/m .  Physical Exam   GENERAL: healthy, alert and no distress  MS: no gross musculoskeletal defects noted, no edema  SKIN: 1-2 cm raised lesion with irregular borders and some inflammation present.  This is removed as above.

## 2023-08-22 ENCOUNTER — OFFICE VISIT (OUTPATIENT)
Dept: FAMILY MEDICINE | Facility: OTHER | Age: 48
End: 2023-08-22
Attending: FAMILY MEDICINE
Payer: COMMERCIAL

## 2023-08-22 VITALS
OXYGEN SATURATION: 95 % | SYSTOLIC BLOOD PRESSURE: 124 MMHG | TEMPERATURE: 98.4 F | BODY MASS INDEX: 41.03 KG/M2 | WEIGHT: 311 LBS | DIASTOLIC BLOOD PRESSURE: 66 MMHG | HEART RATE: 101 BPM

## 2023-08-22 DIAGNOSIS — L98.9 SKIN LESION: Primary | ICD-10-CM

## 2023-08-22 PROCEDURE — 88305 TISSUE EXAM BY PATHOLOGIST: CPT | Mod: TC | Performed by: FAMILY MEDICINE

## 2023-08-22 PROCEDURE — 11302 SHAVE SKIN LESION 1.1-2.0 CM: CPT | Performed by: FAMILY MEDICINE

## 2023-08-22 PROCEDURE — 88305 TISSUE EXAM BY PATHOLOGIST: CPT | Mod: 26 | Performed by: PATHOLOGY

## 2023-08-22 ASSESSMENT — PAIN SCALES - GENERAL: PAINLEVEL: NO PAIN (0)

## 2023-08-26 LAB
PATH REPORT.COMMENTS IMP SPEC: NORMAL
PATH REPORT.FINAL DX SPEC: NORMAL
PATH REPORT.GROSS SPEC: NORMAL
PATH REPORT.MICROSCOPIC SPEC OTHER STN: NORMAL
PATH REPORT.RELEVANT HX SPEC: NORMAL
PHOTO IMAGE: NORMAL

## 2023-10-08 ENCOUNTER — HEALTH MAINTENANCE LETTER (OUTPATIENT)
Age: 48
End: 2023-10-08

## 2024-11-30 ENCOUNTER — HEALTH MAINTENANCE LETTER (OUTPATIENT)
Age: 49
End: 2024-11-30